# Patient Record
Sex: MALE | Race: ASIAN | NOT HISPANIC OR LATINO | Employment: FULL TIME | URBAN - METROPOLITAN AREA
[De-identification: names, ages, dates, MRNs, and addresses within clinical notes are randomized per-mention and may not be internally consistent; named-entity substitution may affect disease eponyms.]

---

## 2017-05-19 ENCOUNTER — CLINICAL SUPPORT (OUTPATIENT)
Dept: AUDIOLOGY | Facility: CLINIC | Age: 27
End: 2017-05-19
Payer: OTHER GOVERNMENT

## 2017-05-19 ENCOUNTER — OFFICE VISIT (OUTPATIENT)
Dept: OTOLARYNGOLOGY | Facility: CLINIC | Age: 27
End: 2017-05-19
Payer: OTHER GOVERNMENT

## 2017-05-19 VITALS — BODY MASS INDEX: 19.03 KG/M2 | HEIGHT: 69 IN | WEIGHT: 128.5 LBS

## 2017-05-19 DIAGNOSIS — H69.91 EUSTACHIAN TUBE DYSFUNCTION, RIGHT: Primary | ICD-10-CM

## 2017-05-19 DIAGNOSIS — H90.11 CONDUCTIVE HEARING LOSS OF RIGHT EAR WITH UNRESTRICTED HEARING OF LEFT EAR: Primary | ICD-10-CM

## 2017-05-19 DIAGNOSIS — H90.11 CONDUCTIVE HEARING LOSS OF RIGHT EAR, UNSPECIFIED HEARING STATUS ON CONTRALATERAL SIDE: ICD-10-CM

## 2017-05-19 PROCEDURE — 99204 OFFICE O/P NEW MOD 45 MIN: CPT | Mod: S$PBB,,, | Performed by: OTOLARYNGOLOGY

## 2017-05-19 PROCEDURE — 92504 EAR MICROSCOPY EXAMINATION: CPT | Mod: PBBFAC | Performed by: STUDENT IN AN ORGANIZED HEALTH CARE EDUCATION/TRAINING PROGRAM

## 2017-05-19 PROCEDURE — 99999 PR PBB SHADOW E&M-EST. PATIENT-LVL II: CPT | Mod: PBBFAC,,, | Performed by: OTOLARYNGOLOGY

## 2017-05-19 PROCEDURE — 99212 OFFICE O/P EST SF 10 MIN: CPT | Mod: PBBFAC | Performed by: OTOLARYNGOLOGY

## 2017-05-19 PROCEDURE — 92504 EAR MICROSCOPY EXAMINATION: CPT | Mod: S$PBB,,, | Performed by: STUDENT IN AN ORGANIZED HEALTH CARE EDUCATION/TRAINING PROGRAM

## 2017-05-19 NOTE — PROGRESS NOTES
Ramesh Wong was seen in the clinic today for a hearing evaluation. Mr. Wong reported a history of ear infections as a child. He has hearing loss in his right ear.      Audiological testing revealed hearing within normal limits in the left ear and borderline normal rising to normal hearing with a conductive component in the right ear. A speech reception threshold was obtained at 5 dBHL in the left ear and 15 dBHL in the right ear. Speech discrimination was 100% in the left ear and 96% in the right ear.    Tympanometry revealed a normal Type A tympanogram in the left ear and a seal could not be obtained in the right ear.    Recommendations:  1. Otologic evaluation  2. Follow up right ear hearing evaluation

## 2017-05-19 NOTE — PROGRESS NOTES
Subjective:       Patient ID: Ramesh Wong is a 26 y.o. male.    Chief Complaint: Collapsed Eardrum    HPI Comments: 25 yo male with hx of tube placement in 2008. Tube fell out a year or so later. Notices decreased hearing on right. Has difficulty equalizing on the right and has pain with decent on plane. No discharge or bleeding.     Otalgia    There is pain in the right ear. This is a recurrent problem. The current episode started more than 1 month ago. The problem has been waxing and waning. There has been no fever. The pain is moderate. Associated symptoms include hearing loss. Pertinent negatives include no coughing, ear discharge, headaches or rhinorrhea. He has tried nothing for the symptoms. His past medical history is significant for hearing loss and a tympanostomy tube.     Review of Systems   Constitutional: Negative.    HENT: Positive for ear pain and hearing loss. Negative for ear discharge and rhinorrhea.    Eyes: Negative for visual disturbance.   Respiratory: Negative for cough and shortness of breath.    Cardiovascular: Negative for chest pain.   Gastrointestinal: Negative.    Endocrine: Negative.    Genitourinary: Negative.    Musculoskeletal: Negative.    Skin: Negative.    Allergic/Immunologic: Negative.    Neurological: Negative for dizziness and headaches.   Hematological: Negative.    Psychiatric/Behavioral: Negative.        No past medical history on file.  Past Surgical History:   Procedure Laterality Date    TYMPANOSTOMY TUBE PLACEMENT         Objective:      Physical Exam   Constitutional: He is oriented to person, place, and time. Vital signs are normal. He appears well-developed and well-nourished.  Non-toxic appearance. He does not have a sickly appearance. No distress.   HENT:   Head: Normocephalic and atraumatic.   Right Ear: External ear and ear canal normal. No tenderness. Tympanic membrane is retracted. Tympanic membrane is not injected. Decreased hearing is noted.   Left Ear:  Hearing, tympanic membrane, external ear and ear canal normal. No tenderness. Tympanic membrane is not injected.   Ears:    Nose: No mucosal edema, rhinorrhea or septal deviation.   Procedure note:    The patient was brought to the minor procedure room and placed in the supine position. The operating video microscope was brought on to the field and the right ear canal, tympanic membrane and other structures were visualized and shown the the patient and family. See graphic for results.  The patient tolerated the procedure well and there were no complications.     Eyes: EOM and lids are normal. Pupils are equal, round, and reactive to light.   Neck: Normal range of motion. No thyroid mass present.   Cardiovascular: Normal rate, regular rhythm and normal heart sounds.    Pulmonary/Chest: Effort normal and breath sounds normal.   Lymphadenopathy:     He has no cervical adenopathy.   Neurological: He is alert and oriented to person, place, and time.   Skin: Skin is warm and dry.   Nursing note and vitals reviewed.            Assessment:       1. Eustachian tube dysfunction, right    2. Conductive hearing loss of right ear, unspecified hearing status on contralateral side        Plan:       Ramesh was seen today for collapsed eardrum.    Diagnoses and all orders for this visit:    Eustachian tube dysfunction, right    Conductive hearing loss of right ear, unspecified hearing status on contralateral side    Discussed Right Tympanomastoidectomy with patient. Intact canal wall vs. Canal wall down depending on the findings at surgery. Discussed possibility of long term tube placement, need for cavity, meatoplasty, cavity care, long term follow up, need for secondary procedures. Risks, complications, alternatives and goals reviewed including possible infection, bleeding, hearing loss, chronic drainage, facial nerve problems, dural injury and other potential problems.

## 2017-05-25 ENCOUNTER — TELEPHONE (OUTPATIENT)
Dept: OTOLARYNGOLOGY | Facility: CLINIC | Age: 27
End: 2017-05-25

## 2017-05-25 DIAGNOSIS — H69.91 DYSFUNCTION OF RIGHT EUSTACHIAN TUBE: Primary | ICD-10-CM

## 2017-06-30 ENCOUNTER — TELEPHONE (OUTPATIENT)
Dept: OTOLARYNGOLOGY | Facility: CLINIC | Age: 27
End: 2017-06-30

## 2017-06-30 NOTE — TELEPHONE ENCOUNTER
----- Message from Zuleyma Yung sent at 6/30/2017 10:16 AM CDT -----  Contact: pt   Pt is calling to speak with the nurse pt is having surgery on July 13th pt is asking if there is a recovery time if he can work ect. Can you please call pt at [phone] jc

## 2017-08-28 ENCOUNTER — TELEPHONE (OUTPATIENT)
Dept: OTOLARYNGOLOGY | Facility: CLINIC | Age: 27
End: 2017-08-28

## 2017-08-28 ENCOUNTER — ANESTHESIA EVENT (OUTPATIENT)
Dept: SURGERY | Facility: HOSPITAL | Age: 27
End: 2017-08-28
Payer: OTHER GOVERNMENT

## 2017-08-28 NOTE — ANESTHESIA PREPROCEDURE EVALUATION
Pre-operative evaluation for Procedure(s) (LRB):  TYMPANOMASTOIDECTOMY (Right)    Ramesh Wong is a 26 y.o. male PMH ear pain s/p tympanostomy tube in 2008 who presents for the above procedure.    LDA: none    Prev airway: none on file    Drips: none    There is no problem list on file for this patient.      Review of patient's allergies indicates:  No Known Allergies     No current facility-administered medications on file prior to encounter.      No current outpatient prescriptions on file prior to encounter.       Past Surgical History:   Procedure Laterality Date    TYMPANOSTOMY TUBE PLACEMENT         Social History     Social History    Marital status: Single     Spouse name: N/A    Number of children: N/A    Years of education: N/A     Occupational History    Not on file.     Social History Main Topics    Smoking status: Never Smoker    Smokeless tobacco: Not on file    Alcohol use No    Drug use: Unknown    Sexual activity: Not on file     Other Topics Concern    Not on file     Social History Narrative    No narrative on file         Vital Signs Range (Last 24H):         CBC: No results for input(s): WBC, RBC, HGB, HCT, PLT, MCV, MCH, MCHC in the last 72 hours.    CMP: No results for input(s): NA, K, CL, CO2, BUN, CREATININE, GLU, MG, PHOS, CALCIUM, ALBUMIN, PROT, ALKPHOS, ALT, AST, BILITOT in the last 72 hours.    INR  No results for input(s): INR, PROTIME, APTT in the last 72 hours.    Invalid input(s): PT        Diagnostic Studies: none      EKG: none on file      2D Echo: none on file          Anesthesia Evaluation    I have reviewed the Patient Summary Reports.    I have reviewed the Nursing Notes.   I have reviewed the Medications.     Review of Systems  Anesthesia Hx:  No problems with previous Anesthesia  History of prior surgery of interest to airway management or planning: Denies Family Hx of Anesthesia complications.   Denies Personal Hx of Anesthesia complications.    Social:  Non-Smoker    Hematology/Oncology:  Hematology Normal   Oncology Normal     Cardiovascular:  Cardiovascular Normal     Pulmonary:  Pulmonary Normal    Renal/:  Renal/ Normal     Hepatic/GI:  Hepatic/GI Normal    Musculoskeletal:  Musculoskeletal Normal    Neurological:  Neurology Normal    Endocrine:  Endocrine Normal    Psych:  Psychiatric Normal           Physical Exam  General:  Well nourished    Airway/Jaw/Neck:  Airway Findings: Mouth Opening: Normal Tongue: Normal  General Airway Assessment: Adult  Mallampati: II  Improves to II with phonation.  TM Distance: Normal, at least 6 cm      Dental:  Dental Findings: In tact   Chest/Lungs:  Chest/Lungs Findings: Clear to auscultation     Heart/Vascular:  Heart Findings: Rate: Normal  Rhythm: Regular Rhythm  Sounds: Normal        Mental Status:  Mental Status Findings:  Cooperative, Alert and Oriented         Anesthesia Plan  Type of Anesthesia, risks & benefits discussed:  Anesthesia Type:  general  Patient's Preference: General  Intra-op Monitoring Plan: standard ASA monitors  Intra-op Monitoring Plan Comments: Standard ASA monitors.   Post Op Pain Control Plan: per primary service following discharge from PACU and multimodal analgesia  Post Op Pain Control Plan Comments: Per primary service.     Induction:   IV  Beta Blocker:  Patient is not currently on a Beta-Blocker (No further documentation required).       Informed Consent: Patient understands risks and agrees with Anesthesia plan.  Questions answered. Anesthesia consent signed with patient.  ASA Score: 1     Day of Surgery Review of History & Physical:    H&P update referred to the surgeon.     Anesthesia Plan Notes: Chart reviewed, patient interviewed and examined.  The plan for general anesthesia was explained.  Questions were answered and the consent was signed.  Sapna VAZQUEZ         Ready For Surgery From Anesthesia Perspective.

## 2017-08-29 ENCOUNTER — ANESTHESIA (OUTPATIENT)
Dept: SURGERY | Facility: HOSPITAL | Age: 27
End: 2017-08-29
Payer: OTHER GOVERNMENT

## 2017-08-29 ENCOUNTER — HOSPITAL ENCOUNTER (OUTPATIENT)
Facility: HOSPITAL | Age: 27
Discharge: HOME | End: 2017-08-29
Attending: OTOLARYNGOLOGY | Admitting: OTOLARYNGOLOGY
Payer: OTHER GOVERNMENT

## 2017-08-29 VITALS
OXYGEN SATURATION: 100 % | RESPIRATION RATE: 16 BRPM | WEIGHT: 130 LBS | TEMPERATURE: 98 F | HEART RATE: 69 BPM | DIASTOLIC BLOOD PRESSURE: 76 MMHG | BODY MASS INDEX: 19.26 KG/M2 | HEIGHT: 69 IN | SYSTOLIC BLOOD PRESSURE: 114 MMHG

## 2017-08-29 DIAGNOSIS — H66.90 CHRONIC OTITIS MEDIA, UNSPECIFIED LATERALITY, UNSPECIFIED OTITIS MEDIA TYPE: Primary | ICD-10-CM

## 2017-08-29 PROCEDURE — 94761 N-INVAS EAR/PLS OXIMETRY MLT: CPT

## 2017-08-29 PROCEDURE — 71000033 HC RECOVERY, INTIAL HOUR: Performed by: OTOLARYNGOLOGY

## 2017-08-29 PROCEDURE — 25000003 PHARM REV CODE 250: Performed by: STUDENT IN AN ORGANIZED HEALTH CARE EDUCATION/TRAINING PROGRAM

## 2017-08-29 PROCEDURE — D9220A PRA ANESTHESIA: Mod: ,,, | Performed by: ANESTHESIOLOGY

## 2017-08-29 PROCEDURE — 27800903 OPTIME MED/SURG SUP & DEVICES OTHER IMPLANTS: Performed by: OTOLARYNGOLOGY

## 2017-08-29 PROCEDURE — 25000003 PHARM REV CODE 250: Performed by: OTOLARYNGOLOGY

## 2017-08-29 PROCEDURE — 63600175 PHARM REV CODE 636 W HCPCS: Performed by: STUDENT IN AN ORGANIZED HEALTH CARE EDUCATION/TRAINING PROGRAM

## 2017-08-29 PROCEDURE — 36000708 HC OR TIME LEV III 1ST 15 MIN: Performed by: OTOLARYNGOLOGY

## 2017-08-29 PROCEDURE — 27000221 HC OXYGEN, UP TO 24 HOURS

## 2017-08-29 PROCEDURE — 36000709 HC OR TIME LEV III EA ADD 15 MIN: Performed by: OTOLARYNGOLOGY

## 2017-08-29 PROCEDURE — 27201423 OPTIME MED/SURG SUP & DEVICES STERILE SUPPLY: Performed by: OTOLARYNGOLOGY

## 2017-08-29 PROCEDURE — 63600175 PHARM REV CODE 636 W HCPCS: Performed by: ANESTHESIOLOGY

## 2017-08-29 PROCEDURE — 71000039 HC RECOVERY, EACH ADD'L HOUR: Performed by: OTOLARYNGOLOGY

## 2017-08-29 PROCEDURE — 37000009 HC ANESTHESIA EA ADD 15 MINS: Performed by: OTOLARYNGOLOGY

## 2017-08-29 PROCEDURE — 69631 REPAIR EARDRUM STRUCTURES: CPT | Mod: RT,,, | Performed by: OTOLARYNGOLOGY

## 2017-08-29 PROCEDURE — 71000015 HC POSTOP RECOV 1ST HR: Performed by: OTOLARYNGOLOGY

## 2017-08-29 PROCEDURE — 37000008 HC ANESTHESIA 1ST 15 MINUTES: Performed by: OTOLARYNGOLOGY

## 2017-08-29 PROCEDURE — 71000016 HC POSTOP RECOV ADDL HR: Performed by: OTOLARYNGOLOGY

## 2017-08-29 DEVICE — TUBE GOODE T-TUBE 1.1MM I.D.: Type: IMPLANTABLE DEVICE | Site: EAR | Status: FUNCTIONAL

## 2017-08-29 RX ORDER — LIDOCAINE HYDROCHLORIDE 10 MG/ML
1 INJECTION, SOLUTION EPIDURAL; INFILTRATION; INTRACAUDAL; PERINEURAL ONCE
Status: COMPLETED | OUTPATIENT
Start: 2017-08-29 | End: 2017-08-29

## 2017-08-29 RX ORDER — CIPROFLOXACIN AND DEXAMETHASONE 3; 1 MG/ML; MG/ML
4 SUSPENSION/ DROPS AURICULAR (OTIC) 2 TIMES DAILY
Qty: 7.5 ML | Refills: 1 | Status: SHIPPED | OUTPATIENT
Start: 2017-09-05 | End: 2017-09-28 | Stop reason: SDUPTHER

## 2017-08-29 RX ORDER — MIDAZOLAM HYDROCHLORIDE 1 MG/ML
INJECTION, SOLUTION INTRAMUSCULAR; INTRAVENOUS
Status: DISCONTINUED | OUTPATIENT
Start: 2017-08-29 | End: 2017-08-29

## 2017-08-29 RX ORDER — CEFAZOLIN SODIUM 2 G/50ML
2 SOLUTION INTRAVENOUS ONCE
Status: DISCONTINUED | OUTPATIENT
Start: 2017-08-29 | End: 2017-08-29 | Stop reason: HOSPADM

## 2017-08-29 RX ORDER — PROPOFOL 10 MG/ML
VIAL (ML) INTRAVENOUS
Status: DISCONTINUED | OUTPATIENT
Start: 2017-08-29 | End: 2017-08-29

## 2017-08-29 RX ORDER — OXYCODONE AND ACETAMINOPHEN 5; 325 MG/1; MG/1
1 TABLET ORAL EVERY 4 HOURS PRN
Status: DISCONTINUED | OUTPATIENT
Start: 2017-08-29 | End: 2017-08-29 | Stop reason: HOSPADM

## 2017-08-29 RX ORDER — ONDANSETRON 2 MG/ML
INJECTION INTRAMUSCULAR; INTRAVENOUS
Status: DISCONTINUED | OUTPATIENT
Start: 2017-08-29 | End: 2017-08-29

## 2017-08-29 RX ORDER — SODIUM CHLORIDE 9 MG/ML
INJECTION, SOLUTION INTRAVENOUS CONTINUOUS
Status: DISCONTINUED | OUTPATIENT
Start: 2017-08-29 | End: 2017-08-29 | Stop reason: HOSPADM

## 2017-08-29 RX ORDER — DIPHENHYDRAMINE HYDROCHLORIDE 50 MG/ML
25 INJECTION INTRAMUSCULAR; INTRAVENOUS EVERY 6 HOURS PRN
Status: DISCONTINUED | OUTPATIENT
Start: 2017-08-29 | End: 2017-08-29 | Stop reason: HOSPADM

## 2017-08-29 RX ORDER — NEOMYCIN SULFATE, POLYMYXIN B SULFATE AND HYDROCORTISONE 10; 3.5; 1 MG/ML; MG/ML; [USP'U]/ML
SUSPENSION/ DROPS AURICULAR (OTIC)
Status: DISCONTINUED | OUTPATIENT
Start: 2017-08-29 | End: 2017-08-29 | Stop reason: HOSPADM

## 2017-08-29 RX ORDER — LIDOCAINE HCL/PF 100 MG/5ML
SYRINGE (ML) INTRAVENOUS
Status: DISCONTINUED | OUTPATIENT
Start: 2017-08-29 | End: 2017-08-29

## 2017-08-29 RX ORDER — HYDROMORPHONE HYDROCHLORIDE 1 MG/ML
0.2 INJECTION, SOLUTION INTRAMUSCULAR; INTRAVENOUS; SUBCUTANEOUS EVERY 5 MIN PRN
Status: DISCONTINUED | OUTPATIENT
Start: 2017-08-29 | End: 2017-08-29 | Stop reason: HOSPADM

## 2017-08-29 RX ORDER — OXYCODONE AND ACETAMINOPHEN 5; 325 MG/1; MG/1
1 TABLET ORAL EVERY 4 HOURS PRN
Qty: 40 TABLET | Refills: 0 | Status: SHIPPED | OUTPATIENT
Start: 2017-08-29

## 2017-08-29 RX ORDER — AMOXICILLIN 500 MG/1
500 TABLET, FILM COATED ORAL EVERY 12 HOURS
Qty: 20 TABLET | Refills: 0 | Status: SHIPPED | OUTPATIENT
Start: 2017-08-29 | End: 2017-09-08

## 2017-08-29 RX ORDER — FENTANYL CITRATE 50 UG/ML
25 INJECTION, SOLUTION INTRAMUSCULAR; INTRAVENOUS EVERY 5 MIN PRN
Status: DISCONTINUED | OUTPATIENT
Start: 2017-08-29 | End: 2017-08-29 | Stop reason: HOSPADM

## 2017-08-29 RX ORDER — ONDANSETRON 4 MG/1
4 TABLET, ORALLY DISINTEGRATING ORAL EVERY 6 HOURS PRN
Qty: 16 TABLET | Refills: 0 | Status: SHIPPED | OUTPATIENT
Start: 2017-08-29

## 2017-08-29 RX ORDER — LIDOCAINE HYDROCHLORIDE AND EPINEPHRINE 10; 10 MG/ML; UG/ML
INJECTION, SOLUTION INFILTRATION; PERINEURAL
Status: DISCONTINUED | OUTPATIENT
Start: 2017-08-29 | End: 2017-08-29 | Stop reason: HOSPADM

## 2017-08-29 RX ORDER — FENTANYL CITRATE 50 UG/ML
INJECTION, SOLUTION INTRAMUSCULAR; INTRAVENOUS
Status: DISCONTINUED | OUTPATIENT
Start: 2017-08-29 | End: 2017-08-29

## 2017-08-29 RX ORDER — CEFAZOLIN SODIUM 1 G/3ML
INJECTION, POWDER, FOR SOLUTION INTRAMUSCULAR; INTRAVENOUS
Status: DISCONTINUED | OUTPATIENT
Start: 2017-08-29 | End: 2017-08-29

## 2017-08-29 RX ORDER — SODIUM CHLORIDE 0.9 % (FLUSH) 0.9 %
3 SYRINGE (ML) INJECTION
Status: DISCONTINUED | OUTPATIENT
Start: 2017-08-29 | End: 2017-08-29 | Stop reason: HOSPADM

## 2017-08-29 RX ORDER — DEXAMETHASONE SODIUM PHOSPHATE 4 MG/ML
INJECTION, SOLUTION INTRA-ARTICULAR; INTRALESIONAL; INTRAMUSCULAR; INTRAVENOUS; SOFT TISSUE
Status: DISCONTINUED | OUTPATIENT
Start: 2017-08-29 | End: 2017-08-29

## 2017-08-29 RX ORDER — NEOMYCIN SULFATE, POLYMYXIN B SULFATE AND HYDROCORTISONE 10; 3.5; 1 MG/ML; MG/ML; [USP'U]/ML
3 SUSPENSION/ DROPS AURICULAR (OTIC) 3 TIMES DAILY
Qty: 10 ML | Refills: 1 | Status: SHIPPED | OUTPATIENT
Start: 2017-09-05 | End: 2017-08-29 | Stop reason: HOSPADM

## 2017-08-29 RX ORDER — ONDANSETRON 2 MG/ML
4 INJECTION INTRAMUSCULAR; INTRAVENOUS DAILY PRN
Status: DISCONTINUED | OUTPATIENT
Start: 2017-08-29 | End: 2017-08-29 | Stop reason: HOSPADM

## 2017-08-29 RX ADMIN — HYDROMORPHONE HYDROCHLORIDE 0.2 MG: 1 INJECTION, SOLUTION INTRAMUSCULAR; INTRAVENOUS; SUBCUTANEOUS at 04:08

## 2017-08-29 RX ADMIN — PROPOFOL 60 MG: 10 INJECTION, EMULSION INTRAVENOUS at 01:08

## 2017-08-29 RX ADMIN — FENTANYL CITRATE 100 MCG: 50 INJECTION, SOLUTION INTRAMUSCULAR; INTRAVENOUS at 11:08

## 2017-08-29 RX ADMIN — SODIUM CHLORIDE, SODIUM GLUCONATE, SODIUM ACETATE, POTASSIUM CHLORIDE, MAGNESIUM CHLORIDE, SODIUM PHOSPHATE, DIBASIC, AND POTASSIUM PHOSPHATE: .53; .5; .37; .037; .03; .012; .00082 INJECTION, SOLUTION INTRAVENOUS at 12:08

## 2017-08-29 RX ADMIN — LIDOCAINE HYDROCHLORIDE 40 MG: 20 INJECTION, SOLUTION INTRAVENOUS at 11:08

## 2017-08-29 RX ADMIN — CEFAZOLIN 2 G: 1 INJECTION, POWDER, FOR SOLUTION INTRAVENOUS at 11:08

## 2017-08-29 RX ADMIN — HYDROMORPHONE HYDROCHLORIDE 0.2 MG: 1 INJECTION, SOLUTION INTRAMUSCULAR; INTRAVENOUS; SUBCUTANEOUS at 03:08

## 2017-08-29 RX ADMIN — SODIUM CHLORIDE: 0.9 INJECTION, SOLUTION INTRAVENOUS at 10:08

## 2017-08-29 RX ADMIN — ONDANSETRON 4 MG: 2 INJECTION INTRAMUSCULAR; INTRAVENOUS at 01:08

## 2017-08-29 RX ADMIN — PROPOFOL 200 MG: 10 INJECTION, EMULSION INTRAVENOUS at 11:08

## 2017-08-29 RX ADMIN — MIDAZOLAM HYDROCHLORIDE 2 MG: 1 INJECTION, SOLUTION INTRAMUSCULAR; INTRAVENOUS at 11:08

## 2017-08-29 RX ADMIN — DEXAMETHASONE SODIUM PHOSPHATE 8 MG: 4 INJECTION, SOLUTION INTRAMUSCULAR; INTRAVENOUS at 11:08

## 2017-08-29 RX ADMIN — LIDOCAINE HYDROCHLORIDE 0.2 MG: 10 INJECTION, SOLUTION EPIDURAL; INFILTRATION; INTRACAUDAL; PERINEURAL at 10:08

## 2017-08-29 RX ADMIN — OXYCODONE HYDROCHLORIDE AND ACETAMINOPHEN 1 TABLET: 5; 325 TABLET ORAL at 02:08

## 2017-08-29 NOTE — DISCHARGE INSTRUCTIONS
Dr. GRETCHEN Jung  EAR SURGERY  ACTIVITY LEVEL:  If you received sedation or an anesthetic, you may feel sleepy for several hours. Rest until you are more awake. Gradually  resume your normal activities. SPECIAL INSTRUCTIONS: Minor degrees of dizziness may be present on head motion and  need not concern you unless it should increase.  DIET:  At home, continue with liquids, and if there is no nausea, you may eat a soft diet. Gradually resume your normal diet.  MEDICATIONS:  You will receive instructions for any pain and/or antibiotic prescriptions. Pain medication should be taken only if needed and  as directed. Antibiotics should be taken as directed until the entire prescription is completed. If ordered, use ear drops as  directed.  ADDITIONAL INFORMATION: ______________________________________________________  BATHING:  Do not get your ear wet until advised by your doctor. Until such time, when showering or washing the hair, cotton covered  with Vaseline may be placed in the outer ear opening.  PRECAUTIONS:  1) Do not blow your nose until such time as your doctor has indicated that your ear is healed. If you must sneeze, please  try to remember to sneeze with the mouth open.  2) Do not pop our ears by holding your nose and blowing air through the Eustachian tube into the ear.  3) Do not have dental work requiring drilling of the teeth until three weeks after surgery.  4) You may anticipate a certain amount of pulsation, popping, clicking, and other sounds in the ear and also feeling of  fullness. Occasional sharp, shooting pains are not unusual. At times, it may feel as if there is liquid in the ear.  5) Consult your doctor before you travel by air.  DRESSING:  External dressings, if used, may be removed the morning after surgery. Bloody, watery discharge may occur during healing.  The outer ear cotton may be changed if necessary. Expect hearing to be slightly worse temporarily, due to packing or tissue  swelling.  Improvement is very gradual.  WHEN TO CALL THE DOCTOR:   Any obvious bleeding   Fever over 101°F (38.4°C)   Persistent pain not relieved by pain medication   Purulent (pus drainage from ear.   Worsening dizziness or vertigo after stapedectomy.  RETURN APPOINTMENT:____________________________________________________________________  FOR EMERGENCIES:  If any unusual problems or difficulties occur, contact  _________________ or the resident at [phone] (page  ) or the Clinic office, [phone].

## 2017-08-29 NOTE — TRANSFER OF CARE
"Anesthesia Transfer of Care Note    Patient: Ramesh Wong    Procedure(s) Performed: Procedure(s) (LRB):  TYMPANOMASTOIDECTOMY with placement of T- tube (Right)    Patient location: PACU    Transport from OR: Transported from OR on 6-10 L/min O2 by face mask with adequate spontaneous ventilation    Post pain: adequate analgesia    Post assessment: no apparent anesthetic complications    Post vital signs: stable    Level of consciousness: awake and alert    Nausea/Vomiting: no nausea/vomiting    Complications: none    Transfer of care protocol was followed      Last vitals:   Visit Vitals  /60 (BP Location: Left arm, Patient Position: Lying)   Pulse 86   Temp 36.6 °C (97.8 °F) (Axillary)   Resp 14   Ht 5' 9" (1.753 m)   Wt 59 kg (130 lb)   SpO2 100%   BMI 19.20 kg/m²     "

## 2017-08-29 NOTE — ANESTHESIA POSTPROCEDURE EVALUATION
"Anesthesia Post Evaluation    Patient: Ramesh Wong    Procedure(s) Performed: Procedure(s) (LRB):  TYMPANOMASTOIDECTOMY with placement of T- tube (Right)    Final Anesthesia Type: general  Patient location during evaluation: PACU  Patient participation: Yes- Able to Participate  Level of consciousness: awake and alert  Post-procedure vital signs: reviewed and stable  Pain management: adequate  Airway patency: patent  PONV status at discharge: No PONV  Anesthetic complications: no      Cardiovascular status: hemodynamically stable  Respiratory status: unassisted  Hydration status: euvolemic  Follow-up not needed.        Visit Vitals  /76   Pulse 69   Temp 36.6 °C (97.8 °F) (Temporal)   Resp 16   Ht 5' 9" (1.753 m)   Wt 59 kg (130 lb)   SpO2 100%   BMI 19.20 kg/m²       Pain/Pascual Score: Pain Assessment Performed: Yes (8/29/2017  4:35 PM)  Presence of Pain: complains of pain/discomfort (8/29/2017  4:35 PM)  Pain Rating Prior to Med Admin: 7 (8/29/2017  4:03 PM)  Pain Rating Post Med Admin: 4 (8/29/2017  3:00 PM)  Pascual Score: 10 (8/29/2017  4:35 PM)      "

## 2017-08-29 NOTE — H&P
Patient ID: Ramesh Wong is a 26 y.o. male.     Chief Complaint: Collapsed Eardrum     Interval HPI: No changes since last seen, states he is ready for surgery today          HPI Comments: 25 yo male with hx of tube placement in 2008. Tube fell out a year or so later. Notices decreased hearing on right. Has difficulty equalizing on the right and has pain with decent on plane. No discharge or bleeding.      Otalgia    There is pain in the right ear. This is a recurrent problem. The current episode started more than 1 month ago. The problem has been waxing and waning. There has been no fever. The pain is moderate. Associated symptoms include hearing loss. Pertinent negatives include no coughing, ear discharge, headaches or rhinorrhea. He has tried nothing for the symptoms. His past medical history is significant for hearing loss and a tympanostomy tube.      Review of Systems   Constitutional: Negative.    HENT: Positive for ear pain and hearing loss. Negative for ear discharge and rhinorrhea.    Eyes: Negative for visual disturbance.   Respiratory: Negative for cough and shortness of breath.    Cardiovascular: Negative for chest pain.   Gastrointestinal: Negative.    Endocrine: Negative.    Genitourinary: Negative.    Musculoskeletal: Negative.    Skin: Negative.    Allergic/Immunologic: Negative.    Neurological: Negative for dizziness and headaches.   Hematological: Negative.    Psychiatric/Behavioral: Negative.        No past medical history on file.        Past Surgical History:   Procedure Laterality Date    TYMPANOSTOMY TUBE PLACEMENT             Objective:      Physical Exam   Constitutional: He is oriented to person, place, and time. Vital signs are normal. He appears well-developed and well-nourished.  Non-toxic appearance. He does not have a sickly appearance. No distress.   HENT:   Head: Normocephalic and atraumatic.   Right Ear: External ear and ear canal normal. No tenderness. Tympanic membrane is  retracted. Tympanic membrane is not injected. Decreased hearing is noted.   Left Ear: Hearing, tympanic membrane, external ear and ear canal normal. No tenderness. Tympanic membrane is not injected.   Ears:    Nose: No mucosal edema, rhinorrhea or septal deviation.   Procedure note:    The patient was brought to the minor procedure room and placed in the supine position. The operating video microscope was brought on to the field and the right ear canal, tympanic membrane and other structures were visualized and shown the the patient and family. See graphic for results.  The patient tolerated the procedure well and there were no complications.     Eyes: EOM and lids are normal. Pupils are equal, round, and reactive to light.   Neck: Normal range of motion. No thyroid mass present.   Cardiovascular: Normal rate, regular rhythm and normal heart sounds.    Pulmonary/Chest: Effort normal and breath sounds normal.   Lymphadenopathy:     He has no cervical adenopathy.   Neurological: He is alert and oriented to person, place, and time.   Skin: Skin is warm and dry.   Nursing note and vitals reviewed.              Assessment:       1. Eustachian tube dysfunction, right    2. Conductive hearing loss of right ear, unspecified hearing status on contralateral side        Plan:       To OR today 8/29/17 for right tympanomastoidectomy

## 2017-08-29 NOTE — BRIEF OP NOTE
[facility]  Brief Operative Note     SUMMARY     Surgery Date: 8/29/2017     Surgeon(s) and Role:     * Reji Jung MD - Primary     * Jacob Patrick Brunner, MD - Resident - Assisting        Pre-op Diagnosis:  Dysfunction of right eustachian tube [H69.81]    Post-op Diagnosis:  Post-Op Diagnosis Codes:     * Dysfunction of right eustachian tube [H69.81]    Procedure(s) (LRB):  TYMPANOMASTOIDECTOMY with placement of T- tube (Right)    Anesthesia: General    Description of the findings of the procedure: right tympanoplasty    Findings/Key Components: see op note    Estimated Blood Loss: * No values recorded between 8/29/2017 12:16 PM and 8/29/2017  1:36 PM *         Specimens:   Specimen (12h ago through future)    None          Discharge Note    SUMMARY     Admit Date: 8/29/2017    Discharge Date and Time:  08/29/2017 1:36 PM    Hospital Course (synopsis of major diagnoses, care, treatment, and services provided during the course of the hospital stay): POD0 s/p right tympanoplasty, procedure went without complications and was well tolerated by patient. Will discharge to home with prompt follow up.     Final Diagnosis: Post-Op Diagnosis Codes:     * Dysfunction of right eustachian tube [H69.81]    Disposition: Home or Self Care    Follow Up/Patient Instructions:     Medications:  Reconciled Home Medications:   Current Discharge Medication List      START taking these medications    Details   amoxicillin (AMOXIL) 500 MG Tab Take 1 tablet (500 mg total) by mouth every 12 (twelve) hours.  Qty: 20 tablet, Refills: 0      ciprofloxacin-dexamethasone 0.3-0.1% (CIPRODEX) 0.3-0.1 % DrpS Place 4 drops into the right ear 2 (two) times daily.  Qty: 7.5 mL, Refills: 1      ondansetron (ZOFRAN-ODT) 4 MG TbDL Take 1 tablet (4 mg total) by mouth every 6 (six) hours as needed.  Qty: 16 tablet, Refills: 0      oxycodone-acetaminophen (PERCOCET) 5-325 mg per tablet Take 1 tablet by mouth every 4 (four) hours  as needed for Pain.  Qty: 40 tablet, Refills: 0             Discharge Procedure Orders  Diet general     Call MD for:  temperature >100.4     Call MD for:  persistent nausea and vomiting or diarrhea     Call MD for:  severe uncontrolled pain     Call MD for:  redness, tenderness, or signs of infection (pain, swelling, redness, odor or green/yellow discharge around incision site)     Call MD for:  severe persistent headache     Call MD for:  difficulty breathing or increased cough     Call MD for:  worsening rash     Call MD for:  persistent dizziness, light-headedness, or visual disturbances     Call MD for:  increased confusion or weakness     Leave dressing on - Keep it clean, dry, and intact until clinic visit   Scheduling Instructions: Dry ear precautions, do not get ear wet       Follow-up Information     Reji Jung MD In 1 week.    Specialty:  Otolaryngology  Contact information:  [address]  [facility] [address]  [phone]             Reji Jung MD In 1 day.    Specialty:  Otolaryngology  Contact information:  [address]  [facility] [address]  [phone]

## 2017-08-29 NOTE — PLAN OF CARE
Discharge instructions given, patient verbalized understanding. Consents in chart, Vitals stable. Patient given iv pain medication for pain to return to a tolerable level to go home.

## 2017-08-29 NOTE — PLAN OF CARE
Problem: Patient Care Overview  Goal: Plan of Care Review  Outcome: Ongoing (interventions implemented as appropriate)  Vital signs stable. Afebrile. Alert, oriented and following commands. Pain controlled with prn medications. Denies nausea. Dressing remains CDI.

## 2017-08-29 NOTE — OP NOTE
DATE OF PROCEDURE:  08/29/2017    PREOPERATIVE DIAGNOSES:  Right chronic eustachian tube dysfunction with a   posterior retraction pocket, incipient cholesteatoma with retraction of tympanic   membrane on to the promontory and middle ear spaces as well as incus and   stapes.    POSTOPERATIVE DIAGNOSES:  Right chronic eustachian tube dysfunction with a   posterior retraction pocket, incipient cholesteatoma with retraction of tympanic   membrane on to the promontory and middle ear spaces as well as incus and   stapes.    OPERATIVE PROCEDURE:  Right postauricular tympanoplasty with elevation of   retraction of the tympanic membrane, placement of a T-tube, underlay grafting   with temporalis fascia.  Use of operative microscope and facial nerve monitor.    SURGEON:  Reji Jung M.D.    ASSISTANT:  ____    ANESTHESIA:  General endotracheal.    OPERATIVE PROCEDURE IN DETAIL:  The patient was brought to the Operating Room   and placed in supine position.  After general endotracheal anesthesia was   induced, the right ear was prepped and draped in usual fashion for transcanal   and postauricular surgery.  Transcanal injection with 1% Xylocaine with   epinephrine was given and the operating microscope was then brought on the field   and used for the remainder of the case.  Once this was done, the ear canal was   visualized and it was found the patient had this significant posterior   retraction and a controlled vascular strip incision was made and this was   followed by postauricular incision.  Postauricular incision was carried down to   the level of temporalis fascia and temporalis fascia was harvested for later   grafting.  T-shaped muscular periosteal incisions were made in the lateral   mastoid cortex and the vascular strip skin was elevated from behind forward.    The ear canal was entered.  Retractors were placed and further inferior relaxing   incision was made, 4 mm lateral to the annulus from the tympanomastoid  suture   line incision and the middle ear was then explored.  The patient's broad-based   posterior tympanic membrane retraction, was carefully microcytic dissected out   of the posterior middle ear space in continuity with direct and indirect   dissection techniques were felt a complete removal was obtained.  This was then   elevated forward and turned forward on the malleus handle.  It was necessary to   micro dissect was retracted tympanic membrane off of the lateral surface of the   incus and stapes but they were otherwise intact without damage.  There were   minimal mucosal changes to the promontory and middle ear mucosa.  With this in   mind multiple layers of Gelfoam was trimmed to size, followed a layer of Gelfoam   and underlay tympanoplasty of the posterior tympanic membrane was done with the   temporalis fascia.  The existing tympanic membrane was then trimmed of this   redundant tissue and rotated over this.  I was then packed into place with   Cortisporin-impregnated Gelfoam.  The vascular strip was then returned to its   normal position and packed into place with Cortisporin-impregnated Gelfoam.    Final closure was then done in layers with 3-0 interrupted Vicryl with   Steri-Strips on the skin.  A sterile pressure dressing was applied.  The patient   tolerated the procedure well.  Estimated blood loss about 2.3 mL.      TBDESEAN/ALBERTO  dd: 08/29/2017 14:02:18 (CDT)  td: 08/29/2017 18:17:12 (CDT)  Doc ID   #[id number]  Job ID #[id number]    CC:

## 2017-08-30 ENCOUNTER — OFFICE VISIT (OUTPATIENT)
Dept: OTOLARYNGOLOGY | Facility: CLINIC | Age: 27
End: 2017-08-30
Payer: OTHER GOVERNMENT

## 2017-08-30 VITALS
HEART RATE: 48 BPM | BODY MASS INDEX: 19.26 KG/M2 | HEIGHT: 69 IN | WEIGHT: 130.06 LBS | SYSTOLIC BLOOD PRESSURE: 108 MMHG | DIASTOLIC BLOOD PRESSURE: 66 MMHG

## 2017-08-30 DIAGNOSIS — Z09 S/P EAR SURGERY, FOLLOW-UP EXAM: Primary | ICD-10-CM

## 2017-08-30 PROCEDURE — 99024 POSTOP FOLLOW-UP VISIT: CPT | Mod: ,,, | Performed by: NURSE PRACTITIONER

## 2017-08-30 PROCEDURE — 99213 OFFICE O/P EST LOW 20 MIN: CPT | Mod: PBBFAC | Performed by: NURSE PRACTITIONER

## 2017-08-30 PROCEDURE — 99999 PR PBB SHADOW E&M-EST. PATIENT-LVL III: CPT | Mod: PBBFAC,,, | Performed by: NURSE PRACTITIONER

## 2017-08-30 NOTE — PROGRESS NOTES
One day S/P Right postauricular tympanoplasty with elevation of   retraction of the tympanic membrane, placement of a T-tube, underlay grafting   with temporalis fascia.     No unusual pain or bleeding. No significant dizziness or vertigo. No nausea or vomiting.    Dressing removed. No evidence of hematoma or seroma. Steristrips intact.  Facial nerve function normal. Packing dry and intact.     Routine re check in one week with appropriate water precautions.

## 2017-09-06 ENCOUNTER — OFFICE VISIT (OUTPATIENT)
Dept: OTOLARYNGOLOGY | Facility: CLINIC | Age: 27
End: 2017-09-06
Payer: OTHER GOVERNMENT

## 2017-09-06 VITALS
SYSTOLIC BLOOD PRESSURE: 104 MMHG | BODY MASS INDEX: 19.21 KG/M2 | DIASTOLIC BLOOD PRESSURE: 68 MMHG | WEIGHT: 130.06 LBS

## 2017-09-06 DIAGNOSIS — Z98.890 S/P EAR SURGERY: Primary | ICD-10-CM

## 2017-09-06 PROCEDURE — 99024 POSTOP FOLLOW-UP VISIT: CPT | Mod: ,,, | Performed by: NURSE PRACTITIONER

## 2017-09-06 PROCEDURE — 99999 PR PBB SHADOW E&M-EST. PATIENT-LVL III: CPT | Mod: PBBFAC,,, | Performed by: NURSE PRACTITIONER

## 2017-09-06 PROCEDURE — 99213 OFFICE O/P EST LOW 20 MIN: CPT | Mod: PBBFAC | Performed by: NURSE PRACTITIONER

## 2017-09-06 NOTE — PATIENT INSTRUCTIONS
Start ear drops (4 drops) to ear three times a day. Stay on drops until next visit. Refill prescription as necessary. There may be some reddish/ yellow discharge from the ear. Use a cotton ball as needed.  Continue to keep water out of the ear. OK to get the incision wet now.     Re check in 3 weeks for packing removal.

## 2017-09-12 ENCOUNTER — TELEPHONE (OUTPATIENT)
Dept: OTOLARYNGOLOGY | Facility: CLINIC | Age: 27
End: 2017-09-12

## 2017-09-12 NOTE — TELEPHONE ENCOUNTER
----- Message from Betzy Weiss sent at 9/12/2017 12:50 PM CDT -----  Call patient about his surgery. Call

## 2017-09-13 ENCOUNTER — TELEPHONE (OUTPATIENT)
Dept: OTOLARYNGOLOGY | Facility: CLINIC | Age: 27
End: 2017-09-13

## 2017-09-13 ENCOUNTER — PATIENT MESSAGE (OUTPATIENT)
Dept: OTOLARYNGOLOGY | Facility: CLINIC | Age: 27
End: 2017-09-13

## 2017-09-28 ENCOUNTER — PATIENT MESSAGE (OUTPATIENT)
Dept: OTOLARYNGOLOGY | Facility: CLINIC | Age: 27
End: 2017-09-28

## 2017-09-28 ENCOUNTER — PATIENT MESSAGE (OUTPATIENT)
Dept: AUDIOLOGY | Facility: CLINIC | Age: 27
End: 2017-09-28

## 2017-09-28 RX ORDER — CIPROFLOXACIN AND DEXAMETHASONE 3; 1 MG/ML; MG/ML
4 SUSPENSION/ DROPS AURICULAR (OTIC) 2 TIMES DAILY
Qty: 7.5 ML | Refills: 1 | Status: SHIPPED | OUTPATIENT
Start: 2017-09-28 | End: 2017-10-02 | Stop reason: SDUPTHER

## 2017-10-02 RX ORDER — CIPROFLOXACIN AND DEXAMETHASONE 3; 1 MG/ML; MG/ML
4 SUSPENSION/ DROPS AURICULAR (OTIC) 2 TIMES DAILY
Qty: 7.5 ML | Refills: 1 | Status: SHIPPED | OUTPATIENT
Start: 2017-10-02

## 2017-10-10 ENCOUNTER — PATIENT MESSAGE (OUTPATIENT)
Dept: AUDIOLOGY | Facility: CLINIC | Age: 27
End: 2017-10-10

## 2017-11-01 ENCOUNTER — PATIENT MESSAGE (OUTPATIENT)
Dept: AUDIOLOGY | Facility: CLINIC | Age: 27
End: 2017-11-01

## 2017-11-29 NOTE — PROGRESS NOTES
One Week S/P Right postauricular tympanoplasty with elevation of   retraction of the tympanic membrane, placement of a T-tube, underlay grafting   with temporalis fascia.     No unusual pain or bleeding. No significant dizziness or vertigo. No nausea or vomiting.    Steri strips removed. No evidence of hematoma or seroma. No evidence of infection. Packing is dry and intact.     Start ototopical drops and re check in three weeks.    
none

## 2024-01-01 NOTE — TELEPHONE ENCOUNTER
Attendance at Delivery    Reason for attendance   Oxygen Needed no  Positive Pressure Needed no  Baby Vigorous yes  Evidence of Meconium no      APGAR 8/9.     PT delivered crying with good tone.  Delayed cord clamping for 30 seconds.  Pt presented to warmer and warmed, dried, stimulated and suctioned as indicated.  PT crying, pink, with good tone. No respiratory intervention indicated at this time. Pt stable and left with L&D nurse.    Patient can not shoot [employer] until he see DR. MANSFIELD on the 11th of October , Appointment slip mailed

## (undated) DEVICE — BLADE OTOLOGY BEAVER 5X84MM

## (undated) DEVICE — BIT DRILL TUBING

## (undated) DEVICE — Device

## (undated) DEVICE — BLADE SCALP OPHTL RND TIP

## (undated) DEVICE — SEE MEDLINE ITEM 146373

## (undated) DEVICE — BURR LSO ROUND FLUTED 5MM

## (undated) DEVICE — CLIPPER BLADE MOD 4406 (CAREF)

## (undated) DEVICE — SOL IRR NACL .9% 3000ML

## (undated) DEVICE — BLADE SCALP OPTHL NDL TIP 3MM

## (undated) DEVICE — SEE MEDLINE ITEM 152622

## (undated) DEVICE — SEE MEDLINE ITEM 157128

## (undated) DEVICE — KIT SUCTION IRRIGATION

## (undated) DEVICE — DRESNG. MASTOID(GLASSCOCK)

## (undated) DEVICE — SUCTION SURGICAL FRAZR

## (undated) DEVICE — BURR CUTT CARB 3X38 E9000 FLUT

## (undated) DEVICE — BURR STEEL ROUND E9000 2X48MM

## (undated) DEVICE — ELECTRODE NDL

## (undated) DEVICE — ELECTRODE REM PLYHSV RETURN 9

## (undated) DEVICE — KIT EAR EAOFE

## (undated) DEVICE — SUT 3/0 18IN COATED VICRYLP

## (undated) DEVICE — KIT DRESS GLASSCK EAR ADLT ST

## (undated) DEVICE — BLADE SURG CARBON STEEL SZ11

## (undated) DEVICE — CLOSURE SKIN STERI STRIP 1/2X4

## (undated) DEVICE — SOL IRR WATER STRL 3000 ML

## (undated) DEVICE — SUT BONE WAX 2.5 GRMS 12/BX